# Patient Record
Sex: FEMALE | Race: WHITE | NOT HISPANIC OR LATINO | URBAN - METROPOLITAN AREA
[De-identification: names, ages, dates, MRNs, and addresses within clinical notes are randomized per-mention and may not be internally consistent; named-entity substitution may affect disease eponyms.]

---
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
## 2022-04-11 PROBLEM — Z72.89 SELF-INJURIOUS BEHAVIOR: Status: RESOLVED | Noted: 2018-02-01 | Resolved: 2022-04-11

## 2023-03-09 NOTE — ED PROVIDER NOTE - PMH
Brief Postoperative Note    Patient: Letty Fatima  YOB: 1963  MRN: [id number]    Date of Procedure: 3/9/2023     Pre-Op Diagnosis: RIGHT BREAST PAPILLOMA    Post-Op Diagnosis: Same as preoperative diagnosis.       Procedure(s):  RIGHT BREAST EXCISIONAL BIOPSY WITH MAGSEED LOCALIZATION(2/24/23)    Surgeon(s):  Lisa Cedillo MD    Surgical Assistant: Surg Asst-1: Mg Bethea    Anesthesia: General     Estimated Blood Loss (mL): Minimal    Complications: None    Specimens:   ID Type Source Tests Collected by Time Destination   1 : RIGHT BREAST BIOPSY Fresh Breast  Lisa Cedillo MD 3/9/2023 1116 Pathology        Implants: * No implants in log *    Drains: * No LDAs found *    Findings: spec radiograph pos magseed and clip    Electronically Signed by Salome Paul MD on 3/2/6312 at 11:42 AM Anxiety    Bulimia    Depression    Self mutilating behavior

## 2024-04-10 NOTE — ED BEHAVIORAL HEALTH ASSESSMENT NOTE - CURRENT INTENT
Detail Level: Zone Render In Strict Bullet Format?: No Continue Regimen: fluconazole 200 mg tablet \\nTake one tablet by mouth for flares.\\nKetoconazole topical cream and Clotrimazole topical cream as needed. None known